# Patient Record
Sex: FEMALE | Race: WHITE | NOT HISPANIC OR LATINO | URBAN - METROPOLITAN AREA
[De-identification: names, ages, dates, MRNs, and addresses within clinical notes are randomized per-mention and may not be internally consistent; named-entity substitution may affect disease eponyms.]

---

## 2017-04-26 ENCOUNTER — TELEPHONE (OUTPATIENT)
Dept: FAMILY MEDICINE | Age: 22
End: 2017-04-26

## 2017-11-09 ENCOUNTER — OFFICE VISIT - HEALTHEAST (OUTPATIENT)
Dept: FAMILY MEDICINE | Facility: CLINIC | Age: 22
End: 2017-11-09

## 2017-11-09 DIAGNOSIS — Z13.29 SCREENING FOR ENDOCRINE, NUTRITIONAL, METABOLIC AND IMMUNITY DISORDER: ICD-10-CM

## 2017-11-09 DIAGNOSIS — Z13.0 SCREENING FOR ENDOCRINE, NUTRITIONAL, METABOLIC AND IMMUNITY DISORDER: ICD-10-CM

## 2017-11-09 DIAGNOSIS — F64.9 GENDER IDENTITY DISORDER: ICD-10-CM

## 2017-11-09 DIAGNOSIS — Z13.21 SCREENING FOR ENDOCRINE, NUTRITIONAL, METABOLIC AND IMMUNITY DISORDER: ICD-10-CM

## 2017-11-09 DIAGNOSIS — Z13.228 SCREENING FOR ENDOCRINE, NUTRITIONAL, METABOLIC AND IMMUNITY DISORDER: ICD-10-CM

## 2017-11-09 DIAGNOSIS — B07.9 WART: ICD-10-CM

## 2017-11-09 DIAGNOSIS — Z00.00 VISIT FOR PREVENTIVE HEALTH EXAMINATION: ICD-10-CM

## 2017-11-09 LAB
CHOLEST SERPL-MCNC: 139 MG/DL
FASTING STATUS PATIENT QL REPORTED: YES
HDLC SERPL-MCNC: 54 MG/DL
LDLC SERPL CALC-MCNC: 77 MG/DL
TRIGL SERPL-MCNC: 41 MG/DL

## 2017-11-09 ASSESSMENT — MIFFLIN-ST. JEOR: SCORE: 1261.4

## 2018-01-09 ENCOUNTER — TELEPHONE (OUTPATIENT)
Dept: FAMILY MEDICINE | Age: 23
End: 2018-01-09

## 2019-09-28 ENCOUNTER — HOSPITAL ENCOUNTER (EMERGENCY)
Facility: CLINIC | Age: 24
Discharge: HOME | End: 2019-09-28
Attending: EMERGENCY MEDICINE | Admitting: EMERGENCY MEDICINE
Payer: COMMERCIAL

## 2019-09-28 VITALS
DIASTOLIC BLOOD PRESSURE: 52 MMHG | BODY MASS INDEX: 18.78 KG/M2 | RESPIRATION RATE: 18 BRPM | HEIGHT: 64 IN | OXYGEN SATURATION: 99 % | HEART RATE: 85 BPM | WEIGHT: 110 LBS | TEMPERATURE: 97.4 F | SYSTOLIC BLOOD PRESSURE: 110 MMHG

## 2019-09-28 DIAGNOSIS — S71.112A LACERATION OF SKIN OF LEFT THIGH, INITIAL ENCOUNTER: ICD-10-CM

## 2019-09-28 DIAGNOSIS — F39 MOOD DISORDER (H): ICD-10-CM

## 2019-09-28 DIAGNOSIS — Z72.89 SELF-INJURIOUS BEHAVIOR: ICD-10-CM

## 2019-09-28 DIAGNOSIS — S71.111A LACERATION OF SKIN OF RIGHT THIGH, INITIAL ENCOUNTER: ICD-10-CM

## 2019-09-28 PROCEDURE — 99283 EMERGENCY DEPT VISIT LOW MDM: CPT | Mod: 25 | Performed by: EMERGENCY MEDICINE

## 2019-09-28 PROCEDURE — 27210282 ZZH ADHESIVE DERMABOND SKIN: Performed by: EMERGENCY MEDICINE

## 2019-09-28 PROCEDURE — 12002 RPR S/N/AX/GEN/TRNK2.6-7.5CM: CPT | Mod: Z6 | Performed by: EMERGENCY MEDICINE

## 2019-09-28 PROCEDURE — 99283 EMERGENCY DEPT VISIT LOW MDM: CPT | Performed by: EMERGENCY MEDICINE

## 2019-09-28 PROCEDURE — 12002 RPR S/N/AX/GEN/TRNK2.6-7.5CM: CPT | Performed by: EMERGENCY MEDICINE

## 2019-09-28 RX ORDER — TESTOSTERONE 1.62 MG/G
GEL TRANSDERMAL DAILY
COMMUNITY

## 2019-09-28 ASSESSMENT — ENCOUNTER SYMPTOMS
NAUSEA: 0
HALLUCINATIONS: 0
COUGH: 0
VOMITING: 0
ABDOMINAL PAIN: 0
SORE THROAT: 0
SHORTNESS OF BREATH: 0
WOUND: 1
RHINORRHEA: 0
FEVER: 0
DIARRHEA: 0

## 2019-09-28 ASSESSMENT — MIFFLIN-ST. JEOR: SCORE: 1409.96

## 2019-09-28 NOTE — ED AVS SNAPSHOT
[facility], [place], Emergency Department  [address]  [facility] [place] [address]  Phone:  [phone]  Fax:  [phone]                                    Armani Roy   MRN: [id number]    Department:  [facility], Emergency Department   Date of Visit:  9/28/2019           After Visit Summary Signature Page    I have received my discharge instructions, and my questions have been answered. I have discussed any challenges I see with this plan with the nurse or doctor.    ..........................................................................................................................................  Patient/Patient Representative Signature      ..........................................................................................................................................  Patient Representative Print Name and Relationship to Patient    ..................................................               ................................................  Date                                   Time    ..........................................................................................................................................  Reviewed by Signature/Title    ...................................................              ..............................................  Date                                               Time          22EPIC Rev 08/18

## 2019-09-29 NOTE — ED NOTES
Patient informed of search and asked to change into scrubs.  Patient agreeable to search  and wearing bottom scrubs and kept upper street clothes

## 2019-09-29 NOTE — ED NOTES
Bed: ED16B  Expected date: 9/28/19  Expected time: 8:05 PM  Means of arrival:   Comments:  [address]. 24M/SI, superficial cut on his leg.

## 2019-09-29 NOTE — DISCHARGE INSTRUCTIONS
You have been seen in the ER for self injurious behavior.  You need to utilize your coping skills when you have increased stress so that you do not harm yourself.  Continue to follow up with your therapist and use your distraction techniques.  Consider utilizing a crisis line if  you are having significant stress or thoughts of harming or killing yourself.    You have had the laceration on the left thigh repaired with glue.  This will fall off on its own in 3-5 days.  Once it does, you are OK to use topical antibiotic ointments such as bacitracin to help prevent infection.

## 2019-09-29 NOTE — ED TRIAGE NOTES
Friends called EMS due to self harm. Patient has history of self harm and today she had superficial bilateral thigh cuts and reported that it is his way of venting stress from life. Patient denies being suicidal. Denies alcohol and drug use

## 2019-09-29 NOTE — ED PROVIDER NOTES
"    [facility] EMERGENCY DEPARTMENT ([facility])    9/28/19       History     Chief Complaint   Patient presents with     Self Injury     friend called EMS, patient has bilateral superficial cuts on thighs, not actively bleeding, history of cutting. patient reported way to vent out stress from life     The history is provided by the patient.     Armani Roy is a 24 year old female to male transgender patient with a medical history significant for depression and anxiety who presents to the Emergency Department for evaluation of self-injurious behavior.  Patient reports that they self-harm today as they have had increased stress in their life.  First, the patient reports that they work 4  jobs and they feel that they are overworked, and stressed from this.  Second, the patient reports that there have been some stressors in their \"friend group\" recently.  The patient reports that they broke up with their partner approximately 1 month ago, but they have the same [place] of friends.  The patient reports that the friends have been ignoring the partner.  The patient states that they feel bad about this as the friends were originally the partner's friends, but now they are mostly only hanging out with the patient.  The patient lives with the ex-partner as well as another roommate, so the patient reports that this adds some increased stress.  They report that they are still friends, but the patient is having difficulty navigating the relationship with the ex-partner.  The patient had a conversation with the ex-partner today and the patient was feeling bad about the situation with the friends.    All of this stress led to the patient cutting themselves on their bilateral thighs.  Patient used a razor.  Patient reports that their last tetanus immunization was in 1/2018, this is confirmed through [facility] records.  Patient reports that they have cut themselves in the past, last time was approximately 1.5 months ago.  The " patient's ex-partner called 911 after the patient cut themselves.    The patient reports a history of depression and anxiety.  They are not on any medications, but they go to therapy appointments once a week.  The patient is seen at [facility].  The patient has been seeing a therapist for the past year.  They do feel that therapy helps them.  The patient denies any recent missed appointments.    Patient reports that the cutting today it was not in an attempt to kill themselves, but states that it is more of a coping mechanism for them.  Patient reports that usually they have coping mechanisms including journaling, going for walks, watching Netflix or drawing.  Patient denies ever trying to kill themselves in the past.  They deny any suicidal ideations today and denies any homicidal ideations.  They deny any hallucinations.  The patient reports that they washed the wound out with hydrogen peroxide and put Neosporin on the lacerations.    The patient reports that they drink alcohol only on the weekends, usually 2-4 beers over a weekend.  The patient does report that they use marijuana a couple of times a week, but they deny any other drug use.    The patient reports that they are on hormone therapy, but denying being on any other medications.  They deny any allergies to medications.    The patient reports that they are feeling improved currently in the Emergency Department and they do feel that they can keep himself safe at home.  Patient states that they have never used a crisis line in the past, but feels that it could help in the future.  Otherwise, the patient denies any recent fevers, cough, congestion, runny nose, sore throat, chest pain, shortness of breath, abdominal pain, nausea, vomiting or diarrhea.    I have reviewed the Medications, Allergies, Past Medical and Surgical History, and Social History in the Epic system.    History reviewed. No pertinent past medical history.    History reviewed. No pertinent  "surgical history.    History reviewed. No pertinent family history.    Social History     Tobacco Use     Smoking status: Never Smoker     Smokeless tobacco: Never Used   Substance Use Topics     Alcohol use: Yes     Comment: occassional       No current facility-administered medications for this encounter.      Current Outpatient Medications   Medication     testosterone (ANDROGEL 1.62 % PUMP) 20.25 MG/ACT gel      No Known Allergies      Review of Systems   Constitutional: Negative for fever.   HENT: Negative for congestion, rhinorrhea and sore throat.    Respiratory: Negative for cough and shortness of breath.    Cardiovascular: Negative for chest pain.   Gastrointestinal: Negative for abdominal pain, diarrhea, nausea and vomiting.   Skin: Positive for wound (lacerations to bilateral thighs).   Psychiatric/Behavioral: Positive for self-injury. Negative for hallucinations and suicidal ideas.   All other systems reviewed and are negative.      Physical Exam   BP: 109/69  Pulse: 85  Temp: 97.4  F (36.3  C)  Height: 162.6 cm (5' 4\")  Weight: 49.9 kg (110 lb)  SpO2: 98 %      Physical Exam  Vitals signs and nursing note reviewed.   Constitutional:       General: She is not in acute distress.     Appearance: She is well-developed. She is not diaphoretic.      Comments: Adult, sitting in chair, NAD.  Initially evasive and guarded with answers to questions but then became more clear and conversational.    HENT:      Head: Normocephalic and atraumatic.   Neck:      Musculoskeletal: Normal range of motion and neck supple.   Cardiovascular:      Rate and Rhythm: Normal rate and regular rhythm.      Heart sounds: Normal heart sounds.   Pulmonary:      Effort: Pulmonary effort is normal. No respiratory distress.      Breath sounds: Normal breath sounds. No wheezing or rales.   Abdominal:      General: Bowel sounds are normal. There is no distension.      Palpations: Abdomen is soft.      Tenderness: There is no tenderness. "   Skin:     General: Skin is warm and dry.      Comments: R thigh: multiple superficial self inflicted lacs, no active bleeding    L thigh: one deeper self inflicted laceration, about 4 cm in length, through dermis with gaping edges. Bleeding controlled.    Neurological:      Mental Status: She is alert and oriented to person, place, and time.   Psychiatric:         Attention and Perception: Attention normal.         Speech: Speech normal.         Behavior: Behavior is cooperative.         Thought Content: Thought content does not include suicidal ideation.         Judgement: Judgement is impulsive.      Comments: Alert, cooperative. Initially guarded but then became more open and conversational. No current SI. Mood reactive. No HI, no psychosis.           ED Course   8:27 PM  The patient was seen and examined by Paola Mcneal MD in Room ED16B.        Procedures         [facility] Procedure Note        Laceration Repair:    Performed by: Paola Mcneal MD  Authorized by: Paola Mcneal MD  Consent given by: Patient who states understanding of the procedure being performed after discussing the risks, benefits and alternatives.    Preparation: Patient was prepped and draped in usual sterile fashion.  Irrigation solution: saline    Body area:left  thigh  Laceration length: 4cm  Contamination: The wound is not contaminated.  Foreign bodies:none  Tendon involvement: none  Anesthesia: none      Debridement: none  Skin closure: Closed with Wound adhesive  Technique: Wound adhesive      Patient tolerance: Patient tolerated the procedure well with no immediate complications.         Critical Care time:  none             Labs Ordered and Resulted from Time of ED Arrival Up to the Time of Departure from the ED - No data to display     No results found for this or any previous visit (from the past 24 hour(s)).           Assessments & Plan (with Medical Decision Making)   This is a 24-year-old who presents to  the Emergency Department today due to cutting.  The patient specifically does state that they were not trying to kill themselves, and simply uses self injurious behavior/cutting as a stress relief.  The patient is able to identify multiple strategies to cope in the future including journaling, taking a walk, watching Netflix/distraction.  I did specifically ask about whether or not they would be interested in using a crisis line when they get to the point where they believe they need to harm themselves and the patient states that they would consider this.  I do not believe that there is any need for acute hospitalization at this time, the patient does have outpatient resources including a therapist that they see once a week.    The patient has multiple self-inflicted lacerations on both anterior thighs.  Most of the lacerations of the right anterior thigh are superficial.  There is one deeper laceration on the left anterior thigh that goes through the dermis and is gaping.  We did irrigate the wounds.  I will place Dermabond on this gaping wound to try and pull the edges together.  Tetanus is up-to-date.  Follow-up with clinic.    This part of the medical record was transcribed by Moreno Paniagua, Medical Scribe, from a dictation done by Paola Mcneal MD.       I have reviewed the nursing notes.    I have reviewed the findings, diagnosis, plan and need for follow up with the patient.    Discharge Medication List as of 9/28/2019  9:24 PM          Final diagnoses:   Self-injurious behavior   Mood disorder (H)   Laceration of skin of left thigh, initial encounter   Laceration of skin of right thigh, initial encounter     I, Moreno Paniagua, am serving as a trained medical scribe to document services personally performed by Paola Mcneal MD, based on the provider's statements to me.   IPaola MD, was physically present and have reviewed and verified the accuracy of this note documented by Moreno  Siva.    9/28/2019   [facility], [place], EMERGENCY DEPARTMENT     Paola Mcneal MD  09/29/19 0057

## 2020-10-24 ENCOUNTER — VIRTUAL VISIT (OUTPATIENT)
Dept: FAMILY MEDICINE | Facility: OTHER | Age: 25
End: 2020-10-24
Payer: COMMERCIAL

## 2020-10-24 PROCEDURE — 99421 OL DIG E/M SVC 5-10 MIN: CPT | Performed by: PHYSICIAN ASSISTANT

## 2020-10-25 NOTE — PROGRESS NOTES
"Date: 10/24/2020 07:51:49  Clinician: Marie Lopez  Clinician NPI: [id number]  Patient: Armani Roy  Patient : 1995  Patient Address: [facility], [place] [address]  Patient Phone: [phone]  Visit Protocol: URI  Patient Summary:  Armani is a 25 year old ( : 1995 ) female who initiated a [facility] Visit for COVID-19 (Coronavirus) evaluation and screening. When asked the question \"Please sign me up to receive news, health information and promotions from [facility].\", Armani responded \"No\".    Armani states her symptoms started 1-2 days ago.   Her symptoms consist of a headache, a cough, nasal congestion, rhinitis, myalgia, and malaise. Armani also feels feverish.   Symptom details     Nasal secretions: The color of her mucus is clear.    Cough: Armani coughs a few times an hour and her cough is not more bothersome at night. Phlegm does not come into her throat when she coughs. She does not believe her cough is caused by post-nasal drip.     Temperature: Her current temperature is 100.6 degrees Fahrenheit. Armani has had a temperature over 100 degrees Fahrenheit for 1-2 days.     Headache: She states the headache is mild (1-3 on a 10 point pain scale).      Armani denies having ear pain, wheezing, enlarged lymph nodes, anosmia, vomiting, nausea, facial pain or pressure, chills, sore throat, teeth pain, ageusia, and diarrhea. She also denies having a sinus infection within the past year, taking antibiotic medication in the past month, and having recent facial or sinus surgery in the past 60 days. She is not experiencing dyspnea.   Precipitating events  She has not recently been exposed to someone with influenza. Armani has been in close contact with the following high risk individuals: people with asthma, heart disease or diabetes.   Pertinent COVID-19 (Coronavirus) information  In the past 14 days, Armani has not worked in a congregate living setting.   She does not work or volunteer as " healthcare worker or a  and does not work or volunteer in a healthcare facility.   Armani also has not lived in a congregate living setting in the past 14 days. She does not live with a healthcare worker.   Armani has not had a close contact with a laboratory-confirmed COVID-19 patient within 14 days of symptom onset.   Since December 2019, Armani and has had upper respiratory infection (URI) or influenza-like illness. Has not been diagnosed with lab-confirmed COVID-19 test      Date(s) of previous URI or influenza-like illness (free-text): 01/31/2020- 02/10/2020     Symptoms Armani experienced during previous URI or influenza-like illness as reported by the patient (free-text): Achiness, fever, cough, congestion, fatigue        Pertinent medical history  Armani does not get yeast infections when she takes antibiotics.   Armani needs a return to work/school note.   Weight: 120 lbs   Armani does not smoke or use smokeless tobacco.   She denies pregnancy and denies breastfeeding. She does not menstruate.   Weight: 120 lbs    MEDICATIONS: AndroGel transdermal, ALLERGIES: NKDA  Clinician Response:  Dear Armani,   Your symptoms show that you may have coronavirus (COVID-19). This illness can cause fever, cough and trouble breathing. Many people get a mild case and get better on their own. Some people can get very sick.  What should I do?  We would like to test you for this virus.   1. Please call [phone] to schedule your visit. Explain that [facility]. Be ready to share your [facility] visit ID number.  Please note that if you are assessed for Covid-19 testing and receive an order for testing from [facility], that the scheduling of your Covid test at [facility] may be delayed by three or four days or more due to limited availability for testing. Additional options for testing can be found on the [place] Covid-19 Response website. https://[place].gov/covid19/    The following  "will serve as your written order for this COVID Test, ordered by me, for the indication of suspected COVID [Z20.828]: The test will be ordered in [employer], our electronic health record, after you are scheduled. It will show as ordered and authorized by William Landeros MD.  Order: COVID-19 (Coronavirus) PCR [facility]TOMATIC [facility].   2. When it's time for your COVID test:  Stay at least 6 feet away from others. (If someone will drive you to your test, stay in the backseat, as far away from the  as you can.)   Cover your mouth and nose with a mask, tissue or washcloth.  Go straight to the testing site. Don't make any stops on the way there or back.      3.Starting now: Stay home and away from others (self-isolate) until:   You've had no fever---and no medicine that reduces fever---for one full day (24 hours). And...   Your other symptoms have gotten better. For example, your cough or breathing has improved. And...   At least 10 days have passed since your symptoms started.       During this time, don't leave the house except for testing or medical care.   Stay in your own room, even for meals. Use your own bathroom if you can.   Stay away from others in your home. No hugging, kissing or shaking hands. No visitors.  Don't go to work, school or anywhere else.    Clean \"high touch\" surfaces often (doorknobs, counters, handles, etc.). Use a household cleaning spray or wipes. You'll find a full list of  on the EPA website: www.epa.gov/pesticide-registration/list-n-disinfectants-use-against-sars-cov-2.   Cover your mouth and nose with a mask, tissue or washcloth to avoid spreading germs.  Wash your hands and face often. Use soap and water.  Caregivers in these groups are at risk for severe illness due to COVID-19:  o People 65 years and older  o People who live in a nursing home or long-term care facility  o People with chronic disease (lung, heart, cancer, diabetes, kidney, liver, immunologic)  o People " who have a weakened immune system, including those who:   Are in cancer treatment  Take medicine that weakens the immune system, such as corticosteroids  Had a bone marrow or organ transplant  Have an immune deficiency  Have poorly controlled HIV or AIDS  Are obese (body mass index of 40 or higher)  Smoke regularly   o Caregivers should wear gloves while washing dishes, handling laundry and cleaning bedrooms and bathrooms.  o Use caution when washing and drying laundry: Don't shake dirty laundry, and use the warmest water setting that you can.  o For more tips, go to www.cdc.gov/coronavirus/2019-ncov/downloads/10Things.pdf.    4.Sign up for [employer]. We know it's scary to hear that you might have COVID-19. We want to track your symptoms to make sure you're okay over the next 2 weeks. Please look for an email from [employer]---this is a free, online program that we'll use to keep in touch. To sign up, follow the link in the email. Learn more at [email or web address]  How can I take care of myself?   Get lots of rest. Drink extra fluids (unless a doctor has told you not to).   Take Tylenol (acetaminophen) for fever or pain. If you have liver or kidney problems, ask your family doctor if it's okay to take Tylenol.   Adults can take either:    650 mg (two 325 mg pills) every 4 to 6 hours, or...   1,000 mg (two 500 mg pills) every 8 hours as needed.    Note: Don't take more than 3,000 mg in one day. Acetaminophen is found in many medicines (both prescribed and over-the-counter medicines). Read all labels to be sure you don't take too much.   For children, check the Tylenol bottle for the right dose. The dose is based on the child's age or weight.    If you have other health problems (like cancer, heart failure, an organ transplant or severe kidney disease): Call your specialty clinic if you don't feel better in the next 2 days.       Know when to call 911. Emergency warning signs include:    Trouble  breathing or shortness of breath Pain or pressure in the chest that doesn't go away Feeling confused like you haven't felt before, or not being able to wake up Bluish-colored lips or face.  Where can I get more information?   [facility] -- About COVID-19: [email or web address]   CDC -- What to Do If You're Sick: www.cdc.gov/coronavirus/2019-ncov/about/steps-when-sick.html   CDC -- Ending Home Isolation: www.cdc.gov/coronavirus/2019-ncov/hcp/disposition-in-home-patients.html   CDC -- Caring for Someone: www.cdc.gov/coronavirus/2019-ncov/if-you-are-sick/care-for-someone.html   [facility] -- Interim Guidance for Hospital Discharge to Home: www.[facility].[place]/diseases/coronavirus/hcp/hospdischarge.pdf   [facility] clinical trials (COVID-19 research studies): clinicalaffairs.[facility]-clinical-trials    Below are the COVID-19 hotlines at the [facility] of Health ([facility]). Interpreters are available.    For health questions: Call 131-082-1941 or 1-119.346.5971 (7 a.m. to 7 p.m.) For questions about schools and childcare: Call 915-887-6674 or 1-858.953.2874 (7 a.m. to 7 p.m.)    Diagnosis: Acute upper respiratory infection, unspecified  Diagnosis ICD: J06.9

## 2021-05-31 VITALS — WEIGHT: 115 LBS | HEIGHT: 64 IN | BODY MASS INDEX: 19.63 KG/M2

## 2021-06-14 NOTE — PROGRESS NOTES
FEMALE ADULT PREVENTIVE EXAM    CHIEF COMPLAINT:  Female preventive exam.    SUBJECTIVE:  Armani Roy is a 22 y.o. female who presents for her routine physical exam.    Patient would like to address the following concerns today: New patient, multiple medical issues, she was born female, but would  like to transgender to male sex, has not started the process yet, but has learned about some clinic where she can go.  Would like a referral.  Multiple warts on the left foot on the right hand, interested on liquid nitrogen treatment, has been present for several months mildly painful.  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  She does have 4 sisters and 2 brothers she is a smoker of her family, parents are not totally coming along for idea of changing sex.    GYNE HISTORY  Menses: yes  Sexually Active: no  Contraception: no  Last Pap: na  Abnormal Pap: na  Vaginal Discharge: no      She  has no past medical history on file.    Lab Results   Component Value Date    WBC 6.7 11/09/2017    HGB 13.0 11/09/2017    HCT 38.9 11/09/2017    MCV 88 11/09/2017     11/09/2017     11/09/2017    K 4.1 11/09/2017    BUN 13 11/09/2017     Lab Results   Component Value Date    CHOL 139 11/09/2017    HDL 54 11/09/2017    LDLCALC 77 11/09/2017    TRIG 41 11/09/2017     Lab Results   Component Value Date    TSH 2.29 11/09/2017     BP Readings from Last 3 Encounters:   11/09/17 108/64   11/16/16 105/66       Surgeries:  No past surgical history on file.    Family History:  Her family history includes Arthritis in her mother; Asthma in her mother; Cancer in her maternal uncle; Heart disease in her maternal grandfather; Hyperlipidemia in her father.    Social History:  She   reports that she has never smoked. She does not have any smokeless tobacco history on file. She reports that she drinks alcohol. She reports that she does not use illicit drugs.    Medications:  No current outpatient prescriptions on file.  HELD MEDICATIONS: None.    Allergies:  No latex allergies.  No Known Allergies         RISK BEHAVIOR & HEALTH HABITS  Self Breast Exam: yes.  Regular Exercise: yes.  Balanced diet: yes.  Seat Belt Use: yes  Calcium intake/Osteoporosis prevention: yes.    Guns: NA  Sun Screen: YES  Dental Care: YES    REVIEW OF SYSTEMS:  Complete head to toe review of systems is otherwise negative except as above.    OBJECTIVE:  VITAL SIGNS:    Vitals:    11/09/17 0922   BP: 108/64   Pulse: 60   Resp: 13   Temp: 97.6  F (36.4  C)     GENERAL:  Patient alert, in no acute distress.  EYES: PERRLA. Extraoccular movements intact, pupils equal, reactive to light and accommodation.  Normal conjunctiva and lids.    ENT:  Hearing grossly normal.  Normal appearance to ears and nose.  Bilateral TM s, external canals, oropharynx normal. Normal lips, gums and teeth.    NECK:  Supple, without thyromegaly or mass, no adenopathies.  RESP:  Clear to auscultation without crackles, wheezes or distress.  Normal respiratory effort.   CV:  Regular rate and rhythm without murmurs, rubs or gallops.  Normal pedal pulses.  No varicosities or edema.  ABDOMEN:  Soft, non-tender, without hepatosplenomegaly, masses, or hernias.   BREASTS:    deferred  :  deferred  Rectal: deferred  LYMPHATIC: Normal palpation of neck.  No lymphadenopathy.  No bruising.  NEURO:  CN II-XII intact, motor & sensory function all intact.  DTR and reflexes normal.  PSYCHIATRIC:  Alert & oriented with normal mood and calm affect.  Good judgment and insight.  SKIN:  Normal inspection and palpation.  MUSCULOSKELETAL: Normal gait and station.  - Spine / Ribs / Pelvis: Normal inspection, ROM, stability and strength: Spine, Head, Neck, Upper and Lower  Extremities.    ASSESSMENT & PLAN  Armani was seen today for annual exam, knee pain, plantar warts and referral.    Diagnoses and all orders for this visit:    Visit for preventive health examination  -     2(CBC w/o Differential)  -     Thyroid Stimulating Hormone (TSH)  -     Cancel: Chlamydia trachomatis & Neisseria gonorrhoeae, Amplified Detection  -     Comprehensive Metabolic Panel  -     Lipid Cascade RANDOM  -     Influenza, Seasonal Quad, Preservative Free 36+ Months  -     HPV vaccine 9 valent 3 dose IM  -     Ambulatory referral to Gynecology  -     Ambulatory referral to Endocrinology  -     Ambulatory referral to Psychology  -     Chlamydia trachomatis & Neisseria gonorrhoeae, Amplified Detection; Future    Screening for endocrine, nutritional, metabolic and immunity disorder  -     2(CBC w/o Differential)  -     Thyroid Stimulating Hormone (TSH)  -     Cancel: Chlamydia trachomatis & Neisseria gonorrhoeae, Amplified Detection  -     Comprehensive Metabolic Panel  -     Lipid Cascade RANDOM  -     Influenza, Seasonal Quad, Preservative Free 36+ Months  -     HPV vaccine 9 valent 3 dose IM  -     Ambulatory referral to Gynecology  -     Ambulatory referral to Endocrinology  -     Ambulatory referral to Psychology  -     Chlamydia trachomatis & Neisseria gonorrhoeae, Amplified Detection; Future    Self-inflicted injury  -     2(CBC w/o Differential)  -     Thyroid Stimulating Hormone (TSH)  -     Cancel: Chlamydia trachomatis & Neisseria gonorrhoeae, Amplified Detection  -     Comprehensive Metabolic Panel  -     Lipid Cascade RANDOM  -     Influenza, Seasonal Quad, Preservative Free 36+ Months  -     HPV vaccine 9 valent 3 dose IM  -     Ambulatory referral to Gynecology  -     Ambulatory referral to Endocrinology  -     Ambulatory referral to Psychology  -     Chlamydia trachomatis & Neisseria gonorrhoeae, Amplified Detection; Future    Gender identity disorder  -     2(CBC w/o Differential)  -      Thyroid Stimulating Hormone (TSH)  -     Cancel: Chlamydia trachomatis & Neisseria gonorrhoeae, Amplified Detection  -     Comprehensive Metabolic Panel  -     Lipid Cascade RANDOM  -     Influenza, Seasonal Quad, Preservative Free 36+ Months  -     HPV vaccine 9 valent 3 dose IM  -     Ambulatory referral to Gynecology  -     Ambulatory referral to Endocrinology  -     Ambulatory referral to Psychology  -     Chlamydia trachomatis & Neisseria gonorrhoeae, Amplified Detection; Future  She was referred to see an endocrinologist on gynecologist at [facility] to discuss about starting the process of her gender transition.  She was also referred to see a psychologist for her history of cutting her left forearm and her borderline personalities.  General  Immunizations reviewed and updated .  Alcohol use, safety and moderation discussed.  Recommended adequate calcium, vitamin D intake/osteoporosis prevention.  Discussed colon cancer screening at age 50, 45 if -American.  Diet and exercise reviewed, including goal of aerobic exercise 30-90 minutes most days of the week, moderation of portions sizes, avoiding eating out and fast food and increase in fruits and vegetables.  Discussed safe sex practices.    Discussed & recommended seat belt (& motorcycle helmet) use.  Discussed & recommended smoke detector.  Discussed sun protection.  Discussed weight management.  Discussed self breast exam, screening mammogram timing.  The following are part of a depression follow up plan for the patient:  coping support assessment, coping support management, emotional support assessment, emotional support education and emotional support management    Amor Peralta MD

## 2021-06-14 NOTE — PROGRESS NOTES
Cryotherapy, skin lesion  Date/Time: 11/12/2017 3:07 PM  Performed by: USAMA WARD  Authorized by: USAMA WARD   Consent: Verbal consent obtained.  Consent given by: patient      2 warts on the left great toe, 4  on the second toe, one  on the right hand were all treated with freezing and thawing cycle x4  Follow-up in about 3-4 weeks.

## 2022-02-17 PROBLEM — Z72.89 OTHER PROBLEMS RELATED TO LIFESTYLE: Status: ACTIVE | Noted: 2017-11-09

## 2022-04-23 ENCOUNTER — TELEPHONE (OUTPATIENT)
Dept: FAMILY MEDICINE | Age: 27
End: 2022-04-23

## 2024-03-27 ENCOUNTER — HOSPITAL ENCOUNTER (OUTPATIENT)
Facility: CLINIC | Age: 29
Setting detail: OBSERVATION
Discharge: HOME | End: 2024-03-28
Attending: EMERGENCY MEDICINE
Payer: COMMERCIAL

## 2024-03-27 DIAGNOSIS — F33.1 MAJOR DEPRESSIVE DISORDER, RECURRENT EPISODE, MODERATE (H): ICD-10-CM

## 2024-03-27 DIAGNOSIS — R45.851 SUICIDAL IDEATION: ICD-10-CM

## 2024-03-27 DIAGNOSIS — F41.9 ANXIETY: ICD-10-CM

## 2024-03-27 PROCEDURE — 99285 EMERGENCY DEPT VISIT HI MDM: CPT

## 2024-03-27 PROCEDURE — 99223 1ST HOSP IP/OBS HIGH 75: CPT

## 2024-03-27 PROCEDURE — G0378 HOSPITAL OBSERVATION PER HR: HCPCS

## 2024-03-27 PROCEDURE — 250N000013 HC RX MED GY IP 250 OP 250 PS 637

## 2024-03-27 RX ORDER — HYDROXYZINE HYDROCHLORIDE 50 MG/1
50 TABLET, FILM COATED ORAL EVERY 6 HOURS PRN
Status: DISCONTINUED | OUTPATIENT
Start: 2024-03-27 | End: 2024-03-28 | Stop reason: HOSPADM

## 2024-03-27 RX ORDER — LANOLIN ALCOHOL/MO/W.PET/CERES
3 CREAM (GRAM) TOPICAL
Status: DISCONTINUED | OUTPATIENT
Start: 2024-03-27 | End: 2024-03-28 | Stop reason: HOSPADM

## 2024-03-27 RX ORDER — TRAZODONE HYDROCHLORIDE 50 MG/1
50 TABLET, FILM COATED ORAL
Status: DISCONTINUED | OUTPATIENT
Start: 2024-03-27 | End: 2024-03-27

## 2024-03-27 RX ORDER — ACETAMINOPHEN 325 MG/1
650 TABLET ORAL EVERY 4 HOURS PRN
Status: DISCONTINUED | OUTPATIENT
Start: 2024-03-27 | End: 2024-03-28 | Stop reason: HOSPADM

## 2024-03-27 RX ORDER — TRAZODONE HYDROCHLORIDE 50 MG/1
50 TABLET, FILM COATED ORAL
Status: DISCONTINUED | OUTPATIENT
Start: 2024-03-27 | End: 2024-03-28 | Stop reason: HOSPADM

## 2024-03-27 RX ADMIN — TRAZODONE HYDROCHLORIDE 50 MG: 50 TABLET ORAL at 21:55

## 2024-03-27 ASSESSMENT — ACTIVITIES OF DAILY LIVING (ADL)
ADLS_ACUITY_SCORE: 35

## 2024-03-27 ASSESSMENT — COLUMBIA-SUICIDE SEVERITY RATING SCALE - C-SSRS
6. HAVE YOU EVER DONE ANYTHING, STARTED TO DO ANYTHING, OR PREPARED TO DO ANYTHING TO END YOUR LIFE?: YES
2. HAVE YOU ACTUALLY HAD ANY THOUGHTS OF KILLING YOURSELF IN THE PAST MONTH?: YES
5. HAVE YOU STARTED TO WORK OUT OR WORKED OUT THE DETAILS OF HOW TO KILL YOURSELF? DO YOU INTEND TO CARRY OUT THIS PLAN?: NO
3. HAVE YOU BEEN THINKING ABOUT HOW YOU MIGHT KILL YOURSELF?: YES
4. HAVE YOU HAD THESE THOUGHTS AND HAD SOME INTENTION OF ACTING ON THEM?: YES
1. IN THE PAST MONTH, HAVE YOU WISHED YOU WERE DEAD OR WISHED YOU COULD GO TO SLEEP AND NOT WAKE UP?: YES

## 2024-03-27 NOTE — ED PROVIDER NOTES
"  History     Chief Complaint:  Psychiatric Evaluation       The history is provided by the patient.      Armani Roy is a 29 year old female with history of depression and ADHD who presents for a psychiatric evaluation. She has been struggling with his mental health for the past 2 weeks. She has been suicidal ideations that have been more intense recently.  She states that she does no suicidal plan.  She has a psychiatrist but they focus more on ADHD than the depression she has been having. She drank a few beers after work today but she doesn't usually drink. Denies drug use. She has not been eating or sleeping well. She has a good support system of friends and family. Denies recent illnesses, medical issues, or history of psychiatric hospitalizations.     Independent Historian:   None - Patient Only    Review of External Notes:      Reviewed prior psychiatric visits last being in January 2024 patient was seen for ADHD.    Medications:    The patient is not currently taking any daily medications.     Past Medical History:    Depression  ADHD      Physical Exam   Patient Vitals for the past 24 hrs:   BP Temp Temp src Pulse Resp SpO2 Height Weight   03/27/24 1739 114/73 97.9  F (36.6  C) Oral -- 18 100 % 1.626 m (5' 4\") 55.3 kg (121 lb 14.4 oz)   03/27/24 1707 117/77 97.9  F (36.6  C) Temporal 80 18 96 % 1.626 m (5' 4\") 54.4 kg (120 lb)     Physical Exam  Vitals reviewed.   Constitutional:       General: She is not in acute distress.     Appearance: She is not ill-appearing.   HENT:      Head: Normocephalic and atraumatic.   Eyes:      Extraocular Movements: Extraocular movements intact.   Cardiovascular:      Rate and Rhythm: Normal rate and regular rhythm.   Pulmonary:      Effort: Pulmonary effort is normal. No respiratory distress.      Breath sounds: Normal breath sounds. No wheezing.   Abdominal:      Palpations: Abdomen is soft.      Tenderness: There is no abdominal tenderness. There is no guarding. "   Musculoskeletal:      Cervical back: Normal range of motion.   Skin:     General: Skin is warm and dry.   Neurological:      Mental Status: She is alert and oriented to person, place, and time.      GCS: GCS eye subscore is 4. GCS verbal subscore is 5. GCS motor subscore is 6.   Psychiatric:      Comments: Suicidal ideations             Emergency Department Course     Emergency Department Course & Assessments:    Interventions:  Medications - No data to display     Independent Interpretation (X-rays, CTs, rhythm strip):  None    Assessments/Consultations/Discussion of Management or Tests:  ED Course as of 24 1906   Wed Mar 27, 2024   1705 I examined the patient and obtained history as noted above.      Social Determinants of Health affecting care:   None    Disposition:  The patient was transferred to [facility].     Impression & Plan    MIPS (If applicable):  N/A    Medical Decision Makin-year-old female presenting today with new suicidal ideations without a plan.  She states that over the last 2 weeks her mental health has been declining.  States that there has been no life changes or events.  Reports having suicidal ideations without a plan.  She has had no prior inpatient psych admissions.  States that she has not addressed the symptoms with her psychiatrist.  She is hemodynamically stable.  Requesting go to [facility].  She is medically cleared to go to [facility] at this time.      Diagnosis:    ICD-10-CM    1. Depression, unspecified depression type  F32.A       2. Suicidal ideation  R45.851           Scribe Disclosure:  I, Dominic Hodges, am serving as a scribe at 4:57 PM on 3/27/2024 to document services personally performed by Caroline Nicholson DO based on my observations and the provider's statements to me.     3/27/2024   Caroline Nicholson DO Doan, Tiffani, DO  24 [id number]

## 2024-03-27 NOTE — ED NOTES
[facility]  ED to EMPATH Checklist:      Goal for EMPATH: Depression management and Suicidality    Current Behavior: Calm and Cooperative    Safety Concerns: Suicidal, no plan    Legal Hold Status: Voluntary    Medically Cleared by ED provider: Yes    Patient Therapeutically Searched: Not searched - Currently in triage    Belongings: Remain with patient    Independent Ambulation at Baseline: Yes/No: Yes    Participates in Care/Conversation: Yes/No: Yes    Patient Informed about EMPATH: Yes/No: Yes    DEC: Not ordered    Patient Ready to be Transferred to EMPATH? Yes/No: Yes

## 2024-03-27 NOTE — ED TRIAGE NOTES
Increasing depression over past 2 weeks.  Suicidal thoughts with no specific plan.   Drank a few beers after work today.  States rarely drinks.   Insomnia.   No hx IP.      Triage Assessment (Adult)       Row Name 03/27/24 4536          Triage Assessment    Airway WDL WDL        Respiratory WDL    Respiratory WDL WDL        Skin Circulation/Temperature WDL    Skin Circulation/Temperature WDL WDL        Cardiac WDL    Cardiac WDL WDL        Peripheral/Neurovascular WDL    Peripheral Neurovascular WDL WDL        Cognitive/Neuro/Behavioral WDL    Cognitive/Neuro/Behavioral WDL WDL

## 2024-03-27 NOTE — PROGRESS NOTES
"29 year old female with history of depression and anxiety received from ED due to increased depression. Reports looking up methods of suicide on line. endorses SI. Nursing and risk assessments completed. Assessments reviewed with LMHP and physician. Admission information reviewed with patient. Patient given a tour of [facility] and instructions on using the facility. Questions regarding [facility] addressed. Pt safety search completed.      Patient states they have been feeling \"numb for the last 2 weeks.\" States they cut their L arm and Left leg this last week\" (superficial cuts noted on L arm and Leg). States this is the 1st episode of self harm in last 5 years and has been having suicidal thoughts the last two weeks but no specific plan. They do state that they have been \"looking up ways that they could kill themselves, including overdosing on cough syrup and buying a gun.  They state there is nothing that has happened or changed recently to cause these changes. States she has a history of depression, anxiety and avoidant restrictive eating disorder but has not been on medications for depression for 5 years and has taken no medications since January of this year. Denies previous suicide attempts  "

## 2024-03-28 VITALS
RESPIRATION RATE: 18 BRPM | DIASTOLIC BLOOD PRESSURE: 73 MMHG | OXYGEN SATURATION: 98 % | SYSTOLIC BLOOD PRESSURE: 110 MMHG | HEIGHT: 64 IN | TEMPERATURE: 98.2 F | BODY MASS INDEX: 20.81 KG/M2 | HEART RATE: 80 BPM | WEIGHT: 121.9 LBS

## 2024-03-28 PROCEDURE — 99239 HOSP IP/OBS DSCHRG MGMT >30: CPT | Performed by: PSYCHIATRY & NEUROLOGY

## 2024-03-28 PROCEDURE — G0378 HOSPITAL OBSERVATION PER HR: HCPCS

## 2024-03-28 RX ORDER — HYDROXYZINE HYDROCHLORIDE 25 MG/1
25 TABLET, FILM COATED ORAL 3 TIMES DAILY PRN
Qty: 15 TABLET | Refills: 0 | Status: SHIPPED | OUTPATIENT
Start: 2024-03-28

## 2024-03-28 RX ORDER — TRAZODONE HYDROCHLORIDE 50 MG/1
50 TABLET, FILM COATED ORAL
Qty: 15 TABLET | Refills: 0 | Status: SHIPPED | OUTPATIENT
Start: 2024-03-28

## 2024-03-28 ASSESSMENT — ACTIVITIES OF DAILY LIVING (ADL)
ADLS_ACUITY_SCORE: 35

## 2024-03-28 ASSESSMENT — COLUMBIA-SUICIDE SEVERITY RATING SCALE - C-SSRS
6. HAVE YOU EVER DONE ANYTHING, STARTED TO DO ANYTHING, OR PREPARED TO DO ANYTHING TO END YOUR LIFE?: NO
TOTAL  NUMBER OF INTERRUPTED ATTEMPTS SINCE LAST CONTACT: NO
TOTAL  NUMBER OF ABORTED OR SELF INTERRUPTED ATTEMPTS SINCE LAST CONTACT: NO
ATTEMPT SINCE LAST CONTACT: NO
2. HAVE YOU ACTUALLY HAD ANY THOUGHTS OF KILLING YOURSELF?: NO
SUICIDE, SINCE LAST CONTACT: NO
1. SINCE LAST CONTACT, HAVE YOU WISHED YOU WERE DEAD OR WISHED YOU COULD GO TO SLEEP AND NOT WAKE UP?: NO

## 2024-03-28 NOTE — DISCHARGE INSTRUCTIONS
Aftercare Plan    Follow up with established providers and supports as scheduled. Continue taking medications as prescribed. Abstain from drugs and alcohol. Utilize your [facility] access hospital mental health crisis team as needed. They are available 24/7. Contact information is listed below.     Outpatient Plan    -Please follow up with your [facility] at [facility].  -Please continue locating an individual therapist. If you change your mind and would like assistance scheduling with a therapist, please call us back at [phone].  -Continue seeking support from friends and family, including your partner, friends, and sister.  -In the event of a mental health crisis, call the National Suicide Prevention Hotline at 988, [facility] Crisis at (333) 048-7875, or return to the hospital.    If I am feeling unsafe or I am in a crisis, I will:   Contact my established care providers  Call [facility] Crisis: [phone].   Call the [place] Suicide Prevention Lifeline: 988  Go to the nearest emergency room   Call 261     Warning signs that I or other people might notice when a crisis is developing for me: changes to sleep, appetite or mood, increased anger, agitation or irritability, feeling depressed or hopeless, spending more time alone or talking less, increased crying, decreased productivity, seeing or hearing things that aren't there, thoughts of not wanting to live anymore or of actually killing myself, thoughts of hurting others    Things I am able to do on my own to cope or help me feel better: watching a favorite tv show or movie, listening to music I enjoy, going outside and breathing fresh air, going for a walk or exercising, taking a shower or bath, a cold or hot beverage, a healthy snack, drawing/coloring/painting, journaling, singing or dancing, deep breathing     I can try practicing square breathing when I begin to feel anxious - inhale through the nose for the count of 4 and the first line on the square.  Exhale through the mouth for the count of 4 for the second line of the square. Repeat to complete the square. Repeat the square as many times as needed.    I can also use my five senses to practice mindfulness and grounding. What are five things I can see, four things I can hear, three things I can feel, two things I can smell, and one thing I can taste.     Things that I am able to do with others to cope or help me feel better: sometimes just talking or spending time with someone else, sharing a meal or having coffee, watching a movie or playing a game, going for a walk or exercising    I can also use community resources including mental health hotlines, [facility] crisis teams, or apps.     Things I can use or do for distraction: movies/tv, music, reading, games, drawing/coloring/painting or other art, essential oils, exercise, cleaning/organizing, puzzles, crossword puzzles, word search, Sudoku       I can also download a meditation or relaxation anurag, like Calm, Headspace, or Insight Timer (all three offer a free version)    Changes I can make to support my mental health and wellness: Attend scheduled mental health therapy and psychiatric appointments. Take my medications as prescribed. Maintain a daily schedule/routine. Abstain from all mood altering substances, including drugs, alcohol, or medications not currently prescribed to me. Implement a self-care routine.      People in my life that I can ask for help: friends or family, trusted teachers/staff/colleagues, trusted members of my community or place of [place], mental health crisis lines, or 911    Your [facility] has a mental health crisis team you can call 24/7: [facility] Adult, [phone]    Other things that are important when I m in crisis: to remember that the feelings I am having right now are temporary, and it won't feel like this forever, and that it is okay and important to ask for help    Crisis Lines  Crisis Text Line  Text 304209  You will be  "connected with a trained live crisis counselor to provide support.    Por vinod, gilliano  ROEL a 355669 o texto a 442-AYUDAME en WhatsApp    National Hope Line  1.800.SUICIDE [8201687]      Community Resources  Fast Tracker  Linking people to mental health and substance use disorder resources  [employer]     [place] Mental Health Warm Line  Peer to peer support  Monday thru Saturday, 12 pm to 10 pm  332.535.1319 or 0.620.385.9149  Text \"Support\" to 78358    National [place] on Mental Illness (TARSHA)  102.255.9782 or 1.888.TARSHA.HELPS      Mental Health Apps  My3  https://Spotsi.org/    VirtualHopeBox  https://[employer]/apps/virtual-hope-box/      Additional Information  Today you were seen by a licensed mental health professional through Triage and Transition services, [facility] ([facility])  for a crisis assessment in the Emergency Department at [facility].  It is recommended that you follow up with your established providers (psychiatrist, mental health therapist, and/or primary care doctor - as relevant) as soon as possible. Coordinators from [facility] will be calling you in the next 24-48 hours to ensure that you have the resources you need.  You can also contact [facility] coordinators directly at [phone]. You may have been scheduled for or offered an appointment with a mental health provider. [facility] maintains an extensive network of licensed behavioral health providers to connect patients with the services they need.  We do not charge providers a fee to participate in our referral network.  We match patients with providers based on a patient's specific needs, insurance coverage, and location.  Our first effort will be to refer you to a provider within your care system, and will utilize providers outside your care system as needed.        "

## 2024-03-28 NOTE — PROGRESS NOTES
"Triage and Transition Services Extended Care Reassessment     Patient: Armani goes by \"Armani,\" uses they/them pronouns  Date of Service: March 28, 2024  Site of Service: [facility] EMERGENCY DEPT                             [id number]  Patient was seen yes  Mode of Assessment: In person     Reason for Reassessment: depression, suicidal ideation    History of Patient's Original Emergency Room Encounter: Pt presented to the ED on 03/27/24 for worsening depression over the past 2 weeks in the context of a recent job change. Pt previously worked as a  and is now working as a health and  at the [employer]. They note that the onboarding process has been a challenge as the expectations and responsibilities of her position are somewhat ambiguous. With the increased anxiety of their new role, they have stopped engaging in some of their coping skills, such as routinely exercising. Especially over the past 2 weeks, pt has felt more disassociative, been crying more frequently, and found it \"harder to want to be here\". They engaged in NSSI via cutting for the first time in 5 years 1-2 days ago. They have also been feeling more nauseous (although have still been eating) and have been having more difficulty falling and staying asleep. They have been sleeping 4-6 hours per night compared to their typical 8. They acknowledge that they were researching overdosing on cough syrup and buying a firearm prior to going to the ED but deny ever having intent to act on these suicidal thoughts. They also deny any suicidal ideation at present, including thoughts of wanting to fall asleep and not wake-up. They report not having access to any firearms at home.    Current Patient Presentation: Pt reports improvement in mood over the past 24 hours. They endorse decreased anxiety and absence of suicidal thoughts, including wanting to fall asleep and not wake-up. They are future-oriented with a goal to " reincoporate exercise into their routine. They are able to identify supports that they can access in the event of a mental health crisis, including their partner, friend Laura, a friend in their apartment building, and their sister. They have an established psychiarist through [employer] and have been actively searching for a therapist. They had one therapy consult already and have another scheduled for next week. They are interested in finding a therapist who specializes in trauma therapy and has knowledge of polyamorous relationships. They declined any assistance scheduling therapy at this point in time but were provided with the care coordination number if they would like assistance with referrals in the future.    Presentation Summary: Pt reports improvement in mood over the past 24 hours. They endorse decreased anxiety and absence of suicidal thoughts, including wanting to fall asleep and not wake-up. They are future-oriented with a goal to reincoporate exercise into their routine. They are able to identify supports that they can access in the event of a mental health crisis, including their partner, friend Laura, a friend in their apartment building, and their sister. They have an established psychiarist through [employer] and have been actively searching for a therapist. They had one therapy consult already and have another scheduled for next week. They are interested in finding a therapist who specializes in trauma therapy and has knowledge of polyamorous relationships. They declined any assistance scheduling therapy at this point in time but were provided with the care coordination number if they would like assistance with referrals in the future.    Changes Observed Since Initial Assessment: decrease in presenting symptoms    Therapeutic Interventions Provided: Engaged in guided discovery, explored patient's perspectives and helped expand them through socratic dialogue.    Current Symptoms: anxious,  excessive worry   anxious, excessive worry        Mental Status Exam   Affect: Appropriate  Appearance: Appropriate  Attention Span/Concentration: Attentive  Eye Contact: Engaged    Fund of Knowledge: Appropriate   Language /Speech Content: Fluent  Language /Speech Volume: Normal  Language /Speech Rate/Productions: Normal  Recent Memory: Intact  Remote Memory: Intact  Mood: Anxious  Orientation to Person: Yes   Orientation to Place: Yes  Orientation to Time of Day: Yes  Orientation to Date: Yes     Situation (Do they understand why they are here?): Yes  Psychomotor Behavior: Normal  Thought Content: Clear  Thought Form: Intact    Treatment Objective(s) Addressed: safety planning, processing feelings, assessing safety    Patient Response to Interventions: eager to participate, acceptance expressed, verbalizes understanding    Progress Towards Goals:  Patient Reports Symptoms Are: improving  Patient Progress Toward Goals: is making progress  Comment: Pt reports a decrease in anxiety over the past 24 hours. They deny any suicidal ideation at present, including thoughts of wanting to fall asleep and not wake-up. They deny access to firearms. They feel more hopeful at present and believe that finding a consistent therapist in the next couple of weeks will lead to further symptom improvement.    Case Management: No case management completed today.     C-SSRS Since Last Contact: 3/28/24  1. Wish to be Dead (Since Last Contact): No  2. Non-Specific Active Suicidal Thoughts (Since Last Contact): No     Actual Attempt (Since Last Contact): No  Has subject engaged in non-suicidal self-injurious behavior? (Since Last Contact): No  Interrupted Attempts (Since Last Contact): No  Aborted or Self-Interrupted Attempt (Since Last Contact): No  Preparatory Acts or Behavior (Since Last Contact): No  Suicide (Since Last Contact): No     Calculated C-SSRS Risk Score (Since Last Contact): No Risk Indicated    Plan: Final Disposition /  Recommended Care Path: discharge  Plan for Care reviewed with assigned Medical Provider: yes  Plan for Care Team Review: provider, RN  Comments: Dr. Arevalo, in agreement  Patient and/or validated legal guardian concurs: yes  Clinical Substantiation: It is the recommendation of this writer that pt discharge to follow-up with outpatient therapy and psychiatry. They report decreased anxiety, increased hopefulness, and no suicidal ideation at present, including thoughts of wanting to fall asleep and not wake-up. They identify a robust support system in their partner, friends, and mother and have goals to reincorporate exercise into their routine. Pt declined assistance scheduling therapy at this point in time and already has therapy consultations lined up. They were provided the care coordination phone number in case they would like assistance with scheduling mental health appointments in the future.    Legal Status: Legal Status at Admission: Voluntary/Patient has signed consent for treatment    Session Status: Time session started: 0940  Time session ended: 1000  Session Duration (minutes): 20 minutes  Session Number: 1  Anticipated number of sessions or this episode of care: 1    Session Start Time: 0940  Session Stop Time: 1000  CPT codes: 63128 - Psychotherapy (with patient) - 30 (16-37*) min  Time Spent: 20 minutes      CPT code(s) utilized: 80072 - Psychotherapy (with patient) - 30 (16-37*) min    Diagnosis:   Patient Active Problem List   Diagnosis Code    Self-inflicted injury Z72.89    Major depressive disorder, recurrent episode, moderate (H) F33.1    Suicidal ideation R45.851    Anxiety F41.9       Primary Problem This Admission: Active Hospital Problems    *Major depressive disorder, recurrent episode, moderate (H)      Suicidal ideation      Anxiety        Lana Dewitt [facility]   Licensed Mental Health Professional (LMHP), [facility]  [phone]

## 2024-03-28 NOTE — ED PROVIDER NOTES
"EmPATH Unit - Initial Psychiatric Observation Note  [facility] Emergency Department  Observation Initiation Date: Mar 27, 2024    Armani Roy MRN: [id number]   Age: 29 year old YOB: 1995     History     Chief Complaint   Patient presents with    Psychiatric Evaluation     HPI  Armani Roy is a 29 year old (they/them preferred pronouns) with a past history notable for depression, anxiety, and ADHD who presented to the ED with increasing depression, suicidal ideation, and thoughts of self-harming for the past 2 weeks. Patient reports that they have been researching ways to die. In the emergency department, this patient was determined to be medically stable and transferred to the EmPATH unit for psychiatric assessment. Patient denies previous suicide attempts and denies any prior inpatient hospitalizations. They have currently been in the emergency department for 5 hours.     Armani tells me that they've been feeling increasingly depressed and anxious for the past 2 weeks. They cannot identify a specific trigger yet does acknowledge a significant job change in January that has disrupted their normal routine. They also report this job change was stressful as training and on boarding were inadequate. They feel as though things have no improved however their mental health has declined. They endorse anhedonia, difficulty sleeping, poor concentration, lack of appetite, anxiety, and suicidal thoughts. They cut last week, prior to this they had not self-harmed for 5 years. Their suicidal thoughts are usually passive however today they looked up ways to die online. When asked if they've thought about acting on these thoughts, they're quite ambivalent noting that they \"just don't feel like being here anymore.\" They're worried that their mental health will end their relationship as their partner is \"not used to me being like this\" and they worry about losing their job. [facility] deny suicidal " "thoughts and feel calmer noting a change in environment has been helpful. They deny HI and denies AH/VH. Deny any past lindsey. They drink alcohol socially and use marijuana. They recall one prior episode of depression in college. At that time they found therapy to be helpful. They recall previous medication trials of sertraline, buspar, wellbutrin and several ADHD medications yet tell me they often stop medications as they prefer more natural approaches and do not want to become dependent on medications. They're unsure if they would like to consider medications again. They're interested in a referral for therapy. They would like to stay overnight at [facility] in hopes of further decreasing intensity of depressive thoughts.    Past Medical History  No past medical history on file.  No past surgical history on file.  testosterone (ANDROGEL 1.62 % PUMP) 20.25 MG/ACT gel      No Known Allergies  Family History  Family History   Problem Relation Age of Onset    Arthritis Mother     Asthma Mother     Hyperlipidemia Father     Cancer Maternal Uncle     Heart Disease Maternal Grandfather      Social History   Social History     Tobacco Use    Smoking status: Never    Smokeless tobacco: Never   Substance Use Topics    Alcohol use: Yes     Comment: occassional    Drug use: Yes     Types: Marijuana     Comment: occassional          Review of Systems  A medically appropriate review of systems was performed with pertinent positives and negatives noted in the HPI, and all other systems negative.    Physical Examination   BP: 117/77  Pulse: 80  Temp: 97.9  F (36.6  C)  Resp: 18  Height: 162.6 cm (5' 4\")  Weight: 54.4 kg (120 lb)  SpO2: 96 %    Physical Exam  General: Appears stated age.   Neuro: Alert and fully oriented. Extremities appear to demonstrate normal strength on visual inspection.   Integumentary/Skin: no rash visualized, normal color    Psychiatric Examination   Appearance: awake, alert, adequately groomed, appeared as age " stated, and casually dressed  Attitude:  cooperative  Eye Contact:  good  Mood:  depressed  Affect:  mood congruent and intensity is flat  Speech:  clear, coherent and normal prosody  Psychomotor Behavior:  no evidence of tardive dyskinesia, dystonia, or tics and intact station, gait and muscle tone  Thought Process:  logical and linear  Associations:  no loose associations  Thought Content:  no evidence of psychotic thought, passive suicidal ideation present, no auditory hallucinations present, and no visual hallucinations present  Insight:  good  Judgement:  intact  Oriented to:  time, person, and place  Attention Span and Concentration:  intact  Recent and Remote Memory:  intact  Language: able to name/identify objects without impairment  Fund of Knowledge: intact with awareness of current and past events    ED Course     ED Course as of 03/27/24 2106   Wed Mar 27, 2024   1705 I examined the patient and obtained history as noted above.        Labs Ordered and Resulted from Time of ED Arrival to Time of ED Departure - No data to display    Assessments & Plan (with Medical Decision Making)   Patient presenting with increasing symptoms of depression and anxiety including suicidal thoughts. Patient reports researching ways to die however they deny specific plans and have not made preparatory actions. Patient recalls one past episode of depression 7 years ago during college in which they found therapy helpful. Patient denies previous suicide attempts and denies any prior inpatient hospitalizations. Patient reports previous medication trials however is not interested in medications noting that they prefer nonpharmacologic options such as therapy. Treatment plan focused on spending time in there therapeutic milieu allowing for a decrease in intensity of symptoms and connecting patient with additional therapeutic outpatient supports. Nursing notes reviewed noting no acute issues.     I have reviewed the assessment  completed by the [facility].     During the observation period, the patient did not require medications for agitation, and did not require restraints/seclusion for patient and/or provider safety.     The patient was found to have a psychiatric condition that would benefit from an observation stay in the emergency department for further psychiatric stabilization and/or coordination of a safe disposition. The observation plan includes serial assessments of psychiatric condition, potential administration of medications if indicated, further disposition pending the patient's psychiatric course during the monitoring period.     Preliminary diagnosis:    ICD-10-CM    1. Suicidal ideation  R45.851       2. Major depressive disorder, recurrent episode, moderate (H)  F33.1       3. Anxiety  F41.9            Treatment Plan:  -Hydroxyzine 50mg every 6 hours as needed for anxiety  -Trazodone 50mg at bedtime as needed for sleep  -Patient declines additional medication targeting depression at this time, did discuss considering trial of selective serotonin reuptake inhibitor or SNRI in the future   -Individual psychotherapy and outpatient psychiatric care recommended for additional support and ongoing development of nonpharmacologic coping skills and strategies.    -Patient would benefit from referral for psychotherapy  -Problem focused supportive therapy and education provided today related to patient's current and acute stressors, symptoms, and diagnoses.  [facility] with reassessment tomorrow     --  ANGEL Sky CNP   [facility] EMERGENCY DEPT  EmPATH Unit       Nadiya Correa APRN CNP  03/27/24 [id number]

## 2024-03-28 NOTE — ED PROVIDER NOTES
"EmPATH Unit - Psychiatric Observation Discharge Summary  [facility] Emergency Department  Discharge Date: 3/28/2024    Armani Roy MRN: [id number]   Age: 29 year old YOB: 1995     Brief HPI & Initial ED Course     Chief Complaint   Patient presents with    Psychiatric Evaluation     HPI  Armani Roy is a 29 year old adult with history notable for major depressive disorder, ADHD, and anxiety who is currently under observation status on the EmPATH unit, now approaching 21 hours in the emergency department.  Overnight, there were no acute issues.  On reassessment today, the patient recalls having tried trazodone yesterday evening and slept very well with the medication.  They are not having side effects to the medication.  They remain uninterested at this time in utilizing a daily scheduled medication intended on treating underlying mood and anxiety symptoms.  They are preferred treatment mode remains as psychotherapy.  The patient did request a as needed medication to use if needed for moments of severe anxiety.  The patient denied suicidal and homicidal thoughts.  There was no indication of psychosis.  They interpret readiness to discharge home today.        Physical Examination   BP: 110/73  Pulse: 80  Temp: 98.2  F (36.8  C)  Resp: 18  Height: 162.6 cm (5' 4\")  Weight: 55.3 kg (121 lb 14.4 oz)  SpO2: 98 %    Physical Exam  General: Appears stated age.   Neuro: Alert and fully oriented. Extremities appear to demonstrate normal strength on visual inspection.   Integumentary/Skin: no rash visualized, normal color    Psychiatric Examination   Appearance: awake, alert  Attitude:  cooperative  Eye Contact:  fair  Mood:  anxious and better  Affect:  appropriate and in normal range  Speech:  clear, coherent  Psychomotor Behavior:  no evidence of tardive dyskinesia, dystonia, or tics  Thought Process:  logical and linear  Associations:  no loose associations  Thought Content:  no evidence of suicidal " ideation or homicidal ideation and no evidence of psychotic thought  Insight:  fair  Judgement:  fair  Oriented to:  time, person, and place  Attention Span and Concentration:  fair  Recent and Remote Memory:  fair  Language: able to name/identify objects without impairment  Fund of Knowledge: intact with awareness of current and past events    Results     ED Course as of 03/28/24 1307   Wed Mar 27, 2024   1705 I examined the patient and obtained history as noted above.        Labs Ordered and Resulted from Time of ED Arrival to Time of ED Departure - No data to display    Observation Course   The patient was found to have a psychiatric condition that would benefit from an observation stay in the emergency department for further psychiatric stabilization and/or coordination of a safe disposition. The plan upon observation admission included serial assessments of psychiatric condition, potential administration of medications if indicated, further disposition pending the patient's psychiatric course during the monitoring period.     Serial assessments of the patient's psychiatric condition were performed. Nursing notes were reviewed. During the observation period, the patient did not require medications for agitation, and did not require restraints/seclusion for patient and/or provider safety.     After a period of working with the treatment team on the EmPATH unit, the patient's mental state improved to allow a safe transition to outpatient care. After counseling on the diagnosis, work-up, and treatment plan, the patient was discharged. Close follow-up with a psychiatrist and/or therapist was recommended and community psychiatric resources were provided. Patient is to return to the ED if any urgent or potentially life-threatening concerns.     Discharge Diagnoses:   Final diagnoses:   Suicidal ideation   Major depressive disorder, recurrent episode, moderate (H)   Anxiety       Treatment Plan:  -Hydroxyzine 25 mg 3  times a day as needed for reduction of anxiety  -Trazodone 50 mg as needed nightly for insomnia  -The patient prefers to utilize psychotherapy as their primary mode of treatment to address underlying mood and anxiety disorders.  They were educated on the role of antidepressant medications in helping to augment psychotherapy if encountering challenges and gaining full remission of symptoms.  -Resume outpatient medication management appointments and psychotherapy  -Discharge home today.      At the time of discharge, the patient's acute suicide risk was determined to be low due to the following factors: Reduction in the intensity of mood/anxiety symptoms that preceded the admission, denial of suicidal thoughts, denies feeling helpless or helpless, not currently under the influence of alcohol or illicit substances, denies experiencing command hallucinations, no immediate access to firearms. The patient's acute risk could be higher if noncompliant with their treatment plan, medications, follow-up appointments or using illicit substances or alcohol. Protective factors include: social supports, stable housing    I spent more than 30 minutes on discharge day activities.    --  Rui Arevalo MD  [facility] EMERGENCY DEPT  EmPATH Unit       Rui Arevalo MD  03/28/24 [id number]

## 2024-03-28 NOTE — CONSULTS
"Diagnostic Evaluation Consultation  Crisis Assessment    Patient Name: Armani Roy  Age:  29 year old  Legal Sex: female  Gender Identity: female  Pronouns:   Race: White  Ethnicity: Not  or   Language: English      Patient was assessed: In person      Patient location: [facility] EMERGENCY DEPT [id number]    Referral Data and Chief Complaint  Armani Roy presents to the ED by  self. Patient is presenting to the ED for the following concerns: Suicidal ideation, Depression.   Factors that make the mental health crisis life threatening or complex are:  Patient presented to the ED with concerns of increased depression, suicidal ideation, and thoughts of self-harming for the past 2 weeks. Today they looked up ways to die online with methods including overdosing on cough medications or buying a gun. Pt stated that they did not engage in preparatory acts or behavior. They did engage in NSSIB by cutting their left arm and left leg two days ago. Pt stated that there is no \"external trigger,\" but reported stress at work due to starting a new position and a more sedentary lifestyle. They endorsed suicidal thoughts today, talked to their partner, who encouraged them to seek [facility]. Pt took an Uber here today. They denied current SI since arriving to the ED..      Informed Consent and Assessment Methods  Explained the crisis assessment process, including applicable information disclosures and limits to confidentiality, assessed understanding of the process, and obtained consent to proceed with the assessment.  Assessment methods included conducting a formal interview with patient, review of medical records, collaboration with medical staff, and obtaining relevant collateral information from family and community providers when available.  : done     Patient response to interventions: acceptance expressed, verbalizes understanding  Coping skills were attempted to reduce the crisis:  Talking to a " friend and partner     History of the Crisis   Patient has a past history notable for depression, anxiety, and ADHD. They reported not being on antidepressant for five years. They were on Strattera at the end of 2023 for a couple of months, but discontinued it on January 2024. Pt is not currently on any medications. They reported having poor interrupted sleep and a poor appetite that past two weeks. They stated that they have tried melatonin and marijuana for sleep, but they were not helpful. Pt stated that smoking marijuana worsened their anxiety. Pt reported a history of NSSIB by cutting. They hadn't cut for five years until this week. Pt denied a history of suicide attempts, IP hospitalizations, and commitments. They do not have outpatient mental health providers currently. They are interested in starting individual therapy.    Brief Psychosocial History  Family:   (pt has a partner), Children no  Support System:  Partner  Employment Status:  employed full-time  Source of Income:  salary/wages  Financial Environmental Concerns:  none  Current Hobbies:  exercise/fitness, interaction with pets (patient has two cats)  Barriers in Personal Life:  mental health concerns    Significant Clinical History  Current Anxiety Symptoms:  anxious (interrupted sleep)  Current Depression/Trauma:  difficulty concentrating, thoughts of death/suicide  Current Somatic Symptoms:     Current Psychosis/Thought Disturbance:     Current Eating Symptoms:  loss of appetite  Chemical Use History:  Alcohol: Social  Benzodiazepines: None  Opiates: None  Cocaine: None  Marijuana: Occasional  Other Use: None   Past diagnosis:  ADHD, Anxiety Disorder, Depression  Family history:  No known history of mental health or chemical health concerns  Past treatment:  Individual therapy, Primary Care, Psychiatric Medication Management  Details of most recent treatment:  They do not have outpatient mental health providers currently. They are interested in  starting individual therapy.  Other relevant history:  Pt has a history of psychiatry medication management.       Collateral Information  Is there collateral information: No          Risk Assessment  [place] Suicide Severity Rating Scale Full Clinical Version: 3/27/24  Suicidal Ideation  Q1 Wish to be Dead (Lifetime): Yes  Q2 Non-Specific Active Suicidal Thoughts (Lifetime): Yes  3. Active Suicidal Ideation with any Methods (Not Plan) Without Intent to Act (Lifetime): Yes  Q4 Active Suicidal Ideation with Some Intent to Act, Without Specific Plan (Lifetime): No  Q5 Active Suicidal Ideation with Specific Plan and Intent (Lifetime): No  Q6 Suicide Behavior (Lifetime): no     Suicidal Behavior (Lifetime)  Actual Attempt (Lifetime): No  Has subject engaged in non-suicidal self-injurious behavior? (Lifetime): Yes  Interrupted Attempts (Lifetime): No  Aborted or Self-Interrupted Attempt (Lifetime): No  Preparatory Acts or Behavior (Lifetime): No    [place] Suicide Severity Rating Scale Recent: 3/27/24  Suicidal Ideation (Recent)  Q1 Wished to be Dead (Past Month): yes  Q2 Suicidal Thoughts (Past Month): yes  Q3 Suicidal Thought Method: yes  Q4 Suicidal Intent without Specific Plan: no  Q5 Suicide Intent with Specific Plan: no  Within the Past 3 Months?: no  Level of Risk per Screen: moderate risk  Intensity of Ideation (Recent)  Most Severe Ideation Rating (Past 1 Month): 3  Frequency (Past 1 Month): Once a week  Duration (Past 1 Month): Less than 1 hour/some of the time  Controllability (Past 1 Month): Can control thoughts with little difficulty  Deterrents (Past 1 Month): Deterrents definitely stopped you from attempting suicide  Suicidal Behavior (Recent)  Actual Attempt (Past 3 Months): No  Has subject engaged in non-suicidal self-injurious behavior? (Past 3 Months): Yes  Interrupted Attempts (Past 3 Months): No  Aborted or Self-Interrupted Attempt (Past 3 Months): No  Preparatory Acts or Behavior (Past 3 Months):  No    Environmental or Psychosocial Events: other life stressors (new job in January)  Protective Factors: Protective Factors: responsibilities and duties to others, including pets and children, lives in a responsibly safe and stable environment, help seeking, good impulse control, good problem-solving, coping, and conflict resolution skills, optimistic outlook - identification of future goals, constructive use of leisure time, enjoyable activities, resilience    Does the patient have thoughts of harming others? Feels Like Hurting Others: no  Previous Attempt to Hurt Others: no  Is the patient engaging in sexually inappropriate behavior?: no    Is the patient engaging in sexually inappropriate behavior?  no        Mental Status Exam   Affect: Appropriate  Appearance: Appropriate  Attention Span/Concentration: Attentive  Eye Contact: Engaged    Fund of Knowledge: Appropriate   Language /Speech Content: Fluent  Language /Speech Volume: Normal  Language /Speech Rate/Productions: Normal  Recent Memory: Intact  Remote Memory: Intact  Mood: Sad  Orientation to Person: Yes   Orientation to Place: Yes  Orientation to Time of Day: Yes  Orientation to Date: Yes     Situation (Do they understand why they are here?): Yes  Psychomotor Behavior: Normal  Thought Content: Clear  Thought Form: Goal Directed          Medication  Psychotropic medications: none  Medication Orders - Psychiatric (From admission, onward)      Start     Dose/Rate Route Frequency Ordered Stop    03/27/24 2152  traZODone (DESYREL) tablet 50 mg         50 mg Oral AT BEDTIME PRN 03/27/24 2152 03/27/24 2106  hydrOXYzine HCl (ATARAX) tablet 50 mg         50 mg Oral EVERY 6 HOURS PRN 03/27/24 2106               Current Care Team  Patient Care Team:  No Ref-Primary, Physician as PCP - General    Diagnosis  Patient Active Problem List   Diagnosis Code    Self-inflicted injury Z72.89    Major depressive disorder, recurrent episode, moderate (H) F33.1     Suicidal ideation R45.851    Anxiety F41.9       Primary Problem This Admission  Active Hospital Problems    *Major depressive disorder, recurrent episode, moderate (H)      Suicidal ideation      Anxiety        Clinical Summary and Substantiation of Recommendations   Patient presented to the ED with concerns of increased depression, suicidal ideation, and thoughts of self-harming for the past 2 weeks. Today they looked up ways to die online. They did not engage in preparatory acts or behavior. They did engage in NSSIB by cutting their left arm and left leg two days ago.They endorsed suicidal thoughts today, talked to their partner, who encouraged them to seek [facility]. Pt took an Uber here today. They denied current SI since arriving to the ED. They would like a referral for therapy. They are not on medications currently, and are unsure if they would like to start scheduled medications. Pt was agreeable to start PRN medications for sleep and anxiety. Pt would like to spend the night at [facility] as they do not feel safe being alone at home tonight.                Patient coping skills attempted to reduce the crisis:  Talking to a friend and partner    Disposition  Recommended disposition: Individual Therapy, Medication Management, Observation        Reviewed case and recommendations with attending provider. Attending Name: ERLIN Sky       Attending concurs with disposition: yes       Patient and/or validated legal guardian concurs with disposition:   yes       Final disposition:  observation    Legal status on admission: Voluntary/Patient has signed consent for treatment    Assessment Details   Total duration spent with the patient: 20 min     CPT code(s) utilized: Non-Billable    ROXANNA Colby, Psychotherapist  [facility] - Triage & Transition Services  Callback: [phone]

## 2024-03-28 NOTE — ED NOTES
Reviewed discharge information with patient.  Denies any suicidal ideation. Prescription given to patient.

## 2024-03-28 NOTE — ED NOTES
Armani reports feeling better today, slept well about 7 hours, feeling less suicidal.  They do not have any triggers at this tines and they do not know why they were feeling suicidal.   Patient participated in the Milieu, working on puzzles for a couple of hours, mood is calm.

## 2024-03-28 NOTE — PROGRESS NOTES
Triage & Transition Services, Extended [facility]     Client Name: Armani Roy    Date: March 28, 2024    Patient was seen yes  Mode of Assessment: In person    Service Type: (P) refused to attend group session  Session Start Time:  (P) 1300    Session End Time: (P) 1330  Session Length: (P) 30  Site Location: [facility] EMERGENCY DEPT                             [id number]  Total Number ofAttendees: (P) 1  Topic:   (P) coping skills/lifestyle management   Response: (P) other (see comments) (pt declined to participate)     Lana Dewitt, [facility]   Licensed Mental Health Professional (LMHP), Extended Care  [phone]